# Patient Record
Sex: FEMALE | Race: ASIAN
[De-identification: names, ages, dates, MRNs, and addresses within clinical notes are randomized per-mention and may not be internally consistent; named-entity substitution may affect disease eponyms.]

---

## 2021-12-20 ENCOUNTER — HOSPITAL ENCOUNTER (EMERGENCY)
Dept: HOSPITAL 35 - ER | Age: 71
Discharge: HOME | End: 2021-12-20
Payer: COMMERCIAL

## 2021-12-20 VITALS — DIASTOLIC BLOOD PRESSURE: 80 MMHG | SYSTOLIC BLOOD PRESSURE: 139 MMHG

## 2021-12-20 VITALS — HEIGHT: 61 IN | BODY MASS INDEX: 27.75 KG/M2 | WEIGHT: 147 LBS

## 2021-12-20 DIAGNOSIS — I10: ICD-10-CM

## 2021-12-20 DIAGNOSIS — N30.90: ICD-10-CM

## 2021-12-20 DIAGNOSIS — Z20.822: ICD-10-CM

## 2021-12-20 DIAGNOSIS — E86.0: Primary | ICD-10-CM

## 2021-12-20 DIAGNOSIS — E11.9: ICD-10-CM

## 2021-12-20 DIAGNOSIS — Z79.899: ICD-10-CM

## 2021-12-20 DIAGNOSIS — R19.7: ICD-10-CM

## 2021-12-20 DIAGNOSIS — Z88.2: ICD-10-CM

## 2021-12-20 LAB
ALBUMIN SERPL-MCNC: 2.9 G/DL (ref 3.4–5)
ALT SERPL-CCNC: 60 U/L (ref 30–65)
ANION GAP SERPL CALC-SCNC: 12 MMOL/L (ref 7–16)
AST SERPL-CCNC: 32 U/L (ref 15–37)
BILIRUB SERPL-MCNC: 0.8 MG/DL (ref 0.2–1)
BILIRUB UR-MCNC: NEGATIVE MG/DL
BUN SERPL-MCNC: 36 MG/DL (ref 7–18)
CALCIUM SERPL-MCNC: 9.3 MG/DL (ref 8.5–10.1)
CHLORIDE SERPL-SCNC: 104 MMOL/L (ref 98–107)
CO2 SERPL-SCNC: 22 MMOL/L (ref 21–32)
COLOR UR: YELLOW
CREAT SERPL-MCNC: 1.2 MG/DL (ref 0.6–1)
ERYTHROCYTE [DISTWIDTH] IN BLOOD BY AUTOMATED COUNT: 14.4 % (ref 10.5–14.5)
GLUCOSE SERPL-MCNC: 251 MG/DL (ref 74–106)
GRANULOCYTES NFR BLD MANUAL: 69 % (ref 36–66)
HCT VFR BLD CALC: 44.2 % (ref 37–47)
HGB BLD-MCNC: 14.3 GM/DL (ref 12–15)
HYALINE CASTS #/AREA URNS LPF: (no result) /LPF
KETONES UR STRIP-MCNC: NEGATIVE MG/DL
LIPASE: 101 U/L (ref 73–393)
LYMPHOCYTES NFR BLD AUTO: 11 % (ref 24–44)
MACROCYTES: (no result)
MCH RBC QN AUTO: 34.5 PG (ref 26–34)
MCHC RBC AUTO-ENTMCNC: 32.3 G/DL (ref 28–37)
MCV RBC: 106.8 FL (ref 80–100)
METAMYELOCYTES NFR BLD: 1 %
MICROCYTES: (no result)
MONOCYTES NFR BLD: 4 % (ref 1–8)
NEUTROPHILS # BLD: 8.1 THOU/UL (ref 1.4–8.2)
NEUTS BAND NFR BLD: 15 % (ref 0–8)
PLATELET # BLD: 142 THOU/UL (ref 150–400)
POLYCHROMASIA BLD QL SMEAR: (no result)
POTASSIUM SERPL-SCNC: 5 MMOL/L (ref 3.5–5.1)
PROT SERPL-MCNC: 6.4 G/DL (ref 6.4–8.2)
RBC # BLD AUTO: 4.14 MIL/UL (ref 4.2–5)
RBC # UR STRIP: (no result) /UL
RBC #/AREA URNS HPF: (no result) /HPF
SODIUM SERPL-SCNC: 138 MMOL/L (ref 136–145)
SP GR UR STRIP: 1.02 (ref 1–1.03)
SQUAMOUS: (no result) /LPF (ref 0–3)
TOXIC GRANULES BLD QL SMEAR: (no result)
URINE CLARITY: (no result)
URINE GLUCOSE-RANDOM*: NEGATIVE
URINE LEUKOCYTES-REFLEX: (no result)
URINE NITRITE-REFLEX: POSITIVE
URINE PROTEIN (DIPSTICK): (no result)
URINE WBC-REFLEX: (no result) /HPF (ref 0–5)
UROBILINOGEN UR STRIP-ACNC: 0.2 E.U./DL (ref 0.2–1)
WBC # BLD AUTO: 9.6 THOU/UL (ref 4–11)

## 2021-12-20 NOTE — EKG
[place] [address] [facility] [address]
[place]  [address]
Phone:  [phone]                    ELECTROCARDIOGRAM REPORT      
_______________________________________________________________________________
 
Name:       ALLAN CUADRA                 Room #:                     REG DONALD WARE#:      [id number]     Account #:      [id number]  
Admission:  21    Attend Phys:                          
Discharge:              Date of Birth:  [birth year]  
                                                          Report #: [id number]
   [id number]
_______________________________________________________________________________
                         [facility] ED
                                       
Test Date:    2021               Test Time:    10:55:03
Pat Name:     ALLAN CUADRA            Department:   
Patient ID:   [id number]            Room:          
Gender:       F                        Technician:   FEDERICO
:          1950               Requested By: Marti Marie
Order Number: [id number] MD:   Howard Yang
                                 Measurements
Intervals                              Axis          
Rate:         105                      P:            7
OR:           127                      QRS:          -17
QRSD:         89                       T:            29
QT:           329                                    
QTc:          435                                    
                           Interpretive Statements
Sinus tachycardia
Borderline left axis deviation
Abnormal R-wave progression, late transition
Minimal ST depression, lateral leads
Compared to ECG 2021 19:36:30
ST (T wave) deviation now present
Sinus rhythm no longer present
Right ventricular hypertrophy no longer present
T-wave abnormality no longer present
Electronically Signed On 2021 12:52:52 CST by Howard Yang
[email or web address]
 
 
 
 
 
 
 
 
 
 
 
 
 
 
 
 
  <ELECTRONICALLY SIGNED>
   By: Howard Yang MD, FACC    
  21     1252
D: 21 1055                           _____________________________________
T: 21 1055                           Howard Yang MD, FACC      /EPI